# Patient Record
Sex: MALE | Race: WHITE | NOT HISPANIC OR LATINO | Employment: OTHER | URBAN - METROPOLITAN AREA
[De-identification: names, ages, dates, MRNs, and addresses within clinical notes are randomized per-mention and may not be internally consistent; named-entity substitution may affect disease eponyms.]

---

## 2021-09-13 ENCOUNTER — TELEPHONE (OUTPATIENT)
Dept: OTOLARYNGOLOGY | Facility: CLINIC | Age: 81
End: 2021-09-13

## 2021-09-13 NOTE — TELEPHONE ENCOUNTER
Health Call Center    Phone Message    May a detailed message be left on voicemail: yes     Reason for Call: Other:   Pt would like an eval to determine if he is a candidate for the inspire device.     Sleep study completed with Dr Johns/Dr Louis at [facility] in [place], [place]. Completed about 10 yrs ago.  Pt will reach out and ask that they fax over sleep study results. Please keep an eye out for that.     Pt has [employer] Medicare insurance and already spoke with Hao, the financial counselor. Pt states that he will be enrolling in a new plan come October and will have new insurance at the beginning of January. Hao ok'd for pt to schedule an appt now for after the first of January.     Please follow-up with pt to advise/schedule.     Action Taken: Other:  ENT    Travel Screening: Not Applicable

## 2021-09-14 NOTE — TELEPHONE ENCOUNTER
Writer called and informed pt that unfortunately Dr. Hu is leaving the clinic and is not taking on any new patients at this time. Writer informed pt that there is another doctor who works with inspire deceives that pt can contact. Pt was provided the clinic number for Dr. Garcia's [place] [facility] [place] [facility]. Writer also informed pt that to be considered for the inspire device pt needs to have a sleep study done within the past three years and based on pts message to call center pts current sleep study is not within that 3 year time frame. Pt expressed understanding.    Christin Parish, EMT

## 2022-01-10 ENCOUNTER — TRANSFERRED RECORDS (OUTPATIENT)
Dept: HEALTH INFORMATION MANAGEMENT | Facility: CLINIC | Age: 82
End: 2022-01-10
Payer: COMMERCIAL

## 2022-02-09 ENCOUNTER — OFFICE VISIT (OUTPATIENT)
Dept: OPHTHALMOLOGY | Facility: CLINIC | Age: 82
End: 2022-02-09
Payer: COMMERCIAL

## 2022-02-09 DIAGNOSIS — H02.135 SENILE ECTROPION OF LEFT LOWER EYELID: ICD-10-CM

## 2022-02-09 DIAGNOSIS — H02.403 INVOLUTIONAL PTOSIS, ACQUIRED, BILATERAL: ICD-10-CM

## 2022-02-09 DIAGNOSIS — H02.831 DERMATOCHALASIS OF BOTH UPPER EYELIDS: Primary | ICD-10-CM

## 2022-02-09 DIAGNOSIS — H02.834 DERMATOCHALASIS OF BOTH UPPER EYELIDS: Primary | ICD-10-CM

## 2022-02-09 DIAGNOSIS — H02.132 SENILE ECTROPION OF RIGHT LOWER EYELID: ICD-10-CM

## 2022-02-09 PROCEDURE — 92081 LIMITED VISUAL FIELD XM: CPT | Performed by: OPHTHALMOLOGY

## 2022-02-09 PROCEDURE — 99204 OFFICE O/P NEW MOD 45 MIN: CPT | Performed by: OPHTHALMOLOGY

## 2022-02-09 PROCEDURE — 92285 EXTERNAL OCULAR PHOTOGRAPHY: CPT | Performed by: OPHTHALMOLOGY

## 2022-02-09 RX ORDER — NITROGLYCERIN 0.4 MG/1
0.4 TABLET SUBLINGUAL
COMMUNITY
Start: 2021-06-22

## 2022-02-09 RX ORDER — BISOPROLOL FUMARATE 5 MG/1
5 TABLET, FILM COATED ORAL
COMMUNITY
Start: 2022-01-31 | End: 2023-01-31

## 2022-02-09 RX ORDER — FLUTICASONE PROPIONATE 50 MCG
1-2 SPRAY, SUSPENSION (ML) NASAL
COMMUNITY
Start: 2021-03-22

## 2022-02-09 RX ORDER — CLOPIDOGREL BISULFATE 75 MG/1
75 TABLET ORAL
COMMUNITY
Start: 2022-01-31

## 2022-02-09 RX ORDER — TAMSULOSIN HYDROCHLORIDE 0.4 MG/1
0.4 CAPSULE ORAL
COMMUNITY
Start: 2021-09-01 | End: 2022-09-01

## 2022-02-09 RX ORDER — AMITRIPTYLINE HYDROCHLORIDE 10 MG/1
10 TABLET ORAL
COMMUNITY
Start: 2021-12-20 | End: 2022-12-20

## 2022-02-09 RX ORDER — LEVOTHYROXINE SODIUM 175 UG/1
175 TABLET ORAL
COMMUNITY
Start: 2021-06-22

## 2022-02-09 RX ORDER — POLYETHYLENE GLYCOL 3350 17 G/17G
17 POWDER, FOR SOLUTION ORAL
COMMUNITY

## 2022-02-09 RX ORDER — CYCLOSPORINE 0.5 MG/ML
EMULSION OPHTHALMIC
COMMUNITY
Start: 2021-06-10

## 2022-02-09 RX ORDER — AMIODARONE HYDROCHLORIDE 200 MG/1
100 TABLET ORAL
COMMUNITY
Start: 2022-01-31

## 2022-02-09 RX ORDER — MELATONIN 10 MG
10 CAPSULE ORAL
COMMUNITY

## 2022-02-09 RX ORDER — LISINOPRIL 5 MG/1
5 TABLET ORAL
COMMUNITY

## 2022-02-09 RX ORDER — ALPRAZOLAM 0.5 MG
0.5 TABLET ORAL
COMMUNITY
Start: 2021-12-20

## 2022-02-09 RX ORDER — EVOLOCUMAB 140 MG/ML
140 INJECTION, SOLUTION SUBCUTANEOUS
COMMUNITY
Start: 2021-11-24

## 2022-02-09 ASSESSMENT — EXTERNAL EXAM - LEFT EYE: OS_EXAM: BROW PTOSIS, WITH FRONTALIS RELAXED, BROW IS BELOW SUPERIOR ORBITAL RIM AND LATERALLY INLINE WITH UPPER LASHES

## 2022-02-09 ASSESSMENT — VISUAL ACUITY
OS_CC: 20/25
OD_CC: 20/25
METHOD: SNELLEN - LINEAR
OD_CC+: -2

## 2022-02-09 ASSESSMENT — CONF VISUAL FIELD: COMMENTS: TANGENT VISUAL FIELD PERFORMED TODAY.

## 2022-02-09 ASSESSMENT — SLIT LAMP EXAM - LIDS: COMMENTS: HEAVY DERMATOCHALASIS RESTING ON LASHES, TRUE PTOSIS

## 2022-02-09 ASSESSMENT — TONOMETRY
IOP_METHOD: TONOPEN
OD_IOP_MMHG: 12
OS_IOP_MMHG: 13

## 2022-02-09 ASSESSMENT — EXTERNAL EXAM - RIGHT EYE: OD_EXAM: BROW PTOSIS, WITH FRONTALIS RELAXED, BROW IS BELOW SUPERIOR ORBITAL RIM AND LATERALLY INLINE WITH UPPER LASHES

## 2022-02-09 NOTE — PROGRESS NOTES
Oculoplastic Clinic New Patient    Patient: Barry Medel MRN# [id number]   YOB: 1940 Age: 81 year old   Date of Visit: Feb 9, 2022    CC: Droopy eyelids obstructing vision.              HPI:     Chief Complaint(s) and History of Present Illness(es)     Droopy Eye Lid Evaluation     Laterality: right upper lid and left upper lid    Associated signs and symptoms: dry eyes.  Negative for eye pain    Pain scale: 0/10              Comments     Patient referred by Dr. Polanco for dermatochalasis consult. Patient   states he has a strong family history of droopy lids. His have been   bothering him for several years. He had a consult in the past but never   went through with the procedure. He notices that he needs to raise his   eyebrows up in order to see clearly but still feels he is only getting   about 50% of the vision he should have. Needs bright lights to see   clearly.     Thyroid removed.          Barry Medel is a 81 year old male who has noted gradual onset of droopy eyelids over the past years. The droopy eyelid is interfering with activities of daily living including driving, and reading. The patient denies double vision, variability of the eyelid position. Uses Restasis twice a day   Has cardiac stents, pace maker defibrilator   On Plavix   EXAM:     MRD1: 0 mm both eyes   Dermatochalasis with excess skin touching eyelashes   Brow ptosis with brow resting below superior orbital rim   Aponeurotic ptosis    VISUAL FIELD:  Right eye untaped:15 degrees Right eye taped:50 degrees  Left eye untaped:20 degrees Left eye taped:50 degrees    Assessment & Plan     Barry Medel is a 81 year old male with the following diagnoses:   1. Dermatochalasis of both upper eyelids    2. Involutional ptosis, acquired, bilateral    3. Senile ectropion of right lower eyelid    4. Senile ectropion of left lower eyelid       Both upper eyelid blepharoplasty and doyle muscle conjunctival resection   Ptosis repair 8.5 mm - [id number]    Considering BLLB (TCF, SP, Formal LTS) - would be self pay. Addendum: decided against BLLB.     [place] due to pacemaker.  Will need to make sure can hold aspirin and Plavix. If needs to continue aspirin can still proceed, but not both.    Pointed out brow asymmetry.          PHOTOS DEMONSTRATE:      Significant dermatochalasis with lids resting on eyelashes and obstructing visual axis  Blepharoptosis  Brow ptosis with thicker brow skin and hairs below the lateral superior orbital rim    Attending Physician Attestation: Complete documentation of historical and exam elements from today's encounter can be found in the full encounter summary report (not reduplicated in this progress note). I personally obtained the chief complaint(s) and history of present illness. I confirmed and edited as necessary the review of systems, past medical/surgical history, family history, social history, and examination findings as documented by others; and I examined the patient myself. I personally reviewed the relevant tests, images, and reports as documented above. I formulated and edited as necessary the assessment and plan and discussed the findings and management plan with the patient. Srini Huynh MD      Today with Barry Medel I reviewed the indications, risks, benefits, and alternatives of the proposed surgical procedure including, but not limited to, failure obtain the desired result  and need for additional surgery, bleeding, infection, loss of vision, loss of the eye, and the remote possibility of permanent damage to any organ system or death with the use of anesthesia.  I provided multiple opportunities for the questions, answered all questions to the best of my ability, and confirmed that my answers and my discussion were understood.

## 2022-02-09 NOTE — LETTER
2022         RE:  :  MRN: Barry Medel  1940  [id number]     Dear Dr. Polanco,    Thank you for asking me to see your patient, Barry Medel, for an oculoplastic   consultation.  My assessment and plan are below.          HPI:     Chief Complaint(s) and History of Present Illness(es)     Droopy Eye Lid Evaluation     Laterality: right upper lid and left upper lid    Associated signs and symptoms: dry eyes.  Negative for eye pain    Pain scale: 0/10              Comments     Patient referred by Dr. Polanco for dermatochalasis consult. Patient   states he has a strong family history of droopy lids. His have been   bothering him for several years. He had a consult in the past but never   went through with the procedure. He notices that he needs to raise his   eyebrows up in order to see clearly but still feels he is only getting   about 50% of the vision he should have. Needs bright lights to see   clearly.     Thyroid removed.          Barry Medel is a 81 year old male who has noted gradual onset of droopy eyelids over the past years. The droopy eyelid is interfering with activities of daily living including driving, and reading. The patient denies double vision, variability of the eyelid position. Uses Restasis twice a day   Has cardiac stents, pace maker defibrilator   On Plavix   EXAM:     MRD1: 0 mm both eyes   Dermatochalasis with excess skin touching eyelashes   Brow ptosis with brow resting below superior orbital rim   Aponeurotic ptosis    VISUAL FIELD:  Right eye untaped:15 degrees Right eye taped:50 degrees  Left eye untaped:20 degrees Left eye taped:50 degrees    Assessment & Plan     Barry Medel is a 81 year old male with the following diagnoses:   1. Dermatochalasis of both upper eyelids    2. Involutional ptosis, acquired, bilateral    3. Senile ectropion of right lower eyelid    4. Senile ectropion of left lower eyelid       Both upper eyelid blepharoplasty and  doyle muscle conjunctival resection  Ptosis repair 8.5 mm - [id number]    Considering BLLB (TCF, SP, Formal LTS) - would be self pay.    [place] due to pacemaker.  Will need to make sure can hold aspirin and Plavix. If needs to continue aspirin can still proceed, but not both.    Pointed out brow asymmetry.         Again, thank you for allowing me to participate in the care of your patient.      Sincerely,    Srini Huynh MD  Department of Ophthalmology and Visual Neurosciences  [facility]    CC: ADRIANE GOMEZ  [facility]  [address] [facility] [address]  Via Fax: [phone]

## 2022-02-09 NOTE — NURSING NOTE
Chief Complaints and History of Present Illnesses   Patient presents with     Droopy Eye Lid Evaluation       Chief Complaint(s) and History of Present Illness(es)     Droopy Eye Lid Evaluation     Laterality: right upper lid and left upper lid    Associated signs and symptoms: dry eyes.  Negative for eye pain    Pain scale: 0/10              Comments     Patient referred by Dr. Polanco for dermatochalasis consult. Patient states he has a strong family history of droopy lids. His have been bothering him for several years. He had a consult in the past but never went through with the procedure. He notices that he needs to raise his eyebrows up in order to see clearly but still feels he is only getting about 50% of the vision he should have. Needs bright lights to see clearly.     Thyroid removed.                 Adelso Bernal, Ophthalmic Assistant

## 2022-03-06 DIAGNOSIS — Z11.59 ENCOUNTER FOR SCREENING FOR OTHER VIRAL DISEASES: Primary | ICD-10-CM

## 2022-04-11 ENCOUNTER — TELEPHONE (OUTPATIENT)
Dept: OPHTHALMOLOGY | Facility: CLINIC | Age: 82
End: 2022-04-11
Payer: COMMERCIAL

## 2022-04-11 LAB — EJECTION FRACTION: 45 %

## 2022-04-11 NOTE — TELEPHONE ENCOUNTER
Patients wife called and left a message to advise that patient needs to cancel his eye procedure that is schedule on 4/15 at the [facility] OR location with Dr. Streeter due to having some cardiac issues that need to be addressed first.    Patients wife states they will call back when ready to reschedule.    This writer confirmed with patient the number to reschedule.    Jaci Delgado  Perioperative   Ophthalmology/Oculoplastics  [phone]

## 2022-06-20 ENCOUNTER — TELEPHONE (OUTPATIENT)
Dept: OPHTHALMOLOGY | Facility: CLINIC | Age: 82
End: 2022-06-20
Payer: COMMERCIAL

## 2022-06-20 NOTE — TELEPHONE ENCOUNTER
Spoke with patient to schedule surgery with Dr. Huynh    Surgery was scheduled on 8/5 at [facility]  Patient will have H&P at [facility] IN [place]     Patient is aware a COVID-19 test is needed before their procedure. The test should be with-in 4 days of their procedure.   Test Details: Date 8/1 Location [facility] IN [place]    Post-Op visit was scheduled on 8/17  Patient is aware a / is needed day of surgery.   Surgery packet was mailed 6/20, patient has my direct contact information for any further questions.

## 2022-08-01 ENCOUNTER — TELEPHONE (OUTPATIENT)
Dept: OPHTHALMOLOGY | Facility: CLINIC | Age: 82
End: 2022-08-01

## 2022-08-01 NOTE — TELEPHONE ENCOUNTER
[facility] Call Center    Phone Message    May a detailed message be left on voicemail: yes     Reason for Call: Mary has some questions for Dr. Huynh's nurse about the upcoming surgery for Barry. Please give her a call back. Thank you    Action Taken: Message routed to:  Adult Clinics: Eye p [phone]    Travel Screening: Not Applicable

## 2022-08-01 NOTE — TELEPHONE ENCOUNTER
Spoke with patients daughter, Maria L, and answered her surgery questions. She asked where to send the COVID results, gave fax number for [facility]. She then asked for copies of preparing for surgery sheet that they were initially given back in February. I was able to scan and email those to her at [email or web address].     Karen Campa, TYREE, 1:34 PM 08/01/2022

## 2022-08-04 ENCOUNTER — ANESTHESIA EVENT (OUTPATIENT)
Dept: SURGERY | Facility: CLINIC | Age: 82
End: 2022-08-04
Payer: COMMERCIAL

## 2022-08-04 RX ORDER — POLYETHYLENE GLYCOL 3350 17 G/17G
17 POWDER, FOR SOLUTION ORAL
COMMUNITY

## 2022-08-04 RX ORDER — MELATONIN 10 MG
10 CAPSULE ORAL
COMMUNITY

## 2022-08-04 RX ORDER — TAMSULOSIN HYDROCHLORIDE 0.4 MG/1
0.4 CAPSULE ORAL
COMMUNITY
Start: 2022-03-15 | End: 2023-03-15

## 2022-08-04 RX ORDER — NITROGLYCERIN 0.4 MG/1
0.4 TABLET SUBLINGUAL
COMMUNITY
Start: 2021-06-22

## 2022-08-05 ENCOUNTER — HOSPITAL ENCOUNTER (OUTPATIENT)
Facility: CLINIC | Age: 82
Discharge: HOME | End: 2022-08-05
Attending: OPHTHALMOLOGY | Admitting: OPHTHALMOLOGY
Payer: COMMERCIAL

## 2022-08-05 ENCOUNTER — ANESTHESIA (OUTPATIENT)
Dept: SURGERY | Facility: CLINIC | Age: 82
End: 2022-08-05
Payer: COMMERCIAL

## 2022-08-05 VITALS
RESPIRATION RATE: 16 BRPM | SYSTOLIC BLOOD PRESSURE: 138 MMHG | DIASTOLIC BLOOD PRESSURE: 80 MMHG | WEIGHT: 224.4 LBS | HEART RATE: 74 BPM | TEMPERATURE: 98 F | OXYGEN SATURATION: 98 % | BODY MASS INDEX: 33.24 KG/M2 | HEIGHT: 69 IN

## 2022-08-05 DIAGNOSIS — H02.831 DERMATOCHALASIS OF BOTH UPPER EYELIDS: ICD-10-CM

## 2022-08-05 DIAGNOSIS — H02.834 DERMATOCHALASIS OF BOTH UPPER EYELIDS: ICD-10-CM

## 2022-08-05 DIAGNOSIS — H02.403 ACQUIRED INVOLUTIONAL PTOSIS OF BOTH EYELIDS: Primary | ICD-10-CM

## 2022-08-05 PROCEDURE — 360N000076 HC SURGERY LEVEL 3, PER MIN: Performed by: OPHTHALMOLOGY

## 2022-08-05 PROCEDURE — 710N000009 HC RECOVERY PHASE 1, LEVEL 1, PER MIN: Performed by: OPHTHALMOLOGY

## 2022-08-05 PROCEDURE — 250N000011 HC RX IP 250 OP 636

## 2022-08-05 PROCEDURE — 710N000012 HC RECOVERY PHASE 2, PER MINUTE: Performed by: OPHTHALMOLOGY

## 2022-08-05 PROCEDURE — 370N000017 HC ANESTHESIA TECHNICAL FEE, PER MIN: Performed by: OPHTHALMOLOGY

## 2022-08-05 PROCEDURE — 250N000009 HC RX 250: Performed by: OPHTHALMOLOGY

## 2022-08-05 PROCEDURE — 999N000141 HC STATISTIC PRE-PROCEDURE NURSING ASSESSMENT: Performed by: OPHTHALMOLOGY

## 2022-08-05 PROCEDURE — 250N000009 HC RX 250

## 2022-08-05 PROCEDURE — 250N000013 HC RX MED GY IP 250 OP 250 PS 637: Performed by: ANESTHESIOLOGY

## 2022-08-05 PROCEDURE — 272N000001 HC OR GENERAL SUPPLY STERILE: Performed by: OPHTHALMOLOGY

## 2022-08-05 PROCEDURE — 15823 BLEPHARP UPR EYELID XCSV SKN: CPT | Mod: 50 | Performed by: OPHTHALMOLOGY

## 2022-08-05 PROCEDURE — 258N000003 HC RX IP 258 OP 636

## 2022-08-05 RX ORDER — ERYTHROMYCIN 5 MG/G
OINTMENT OPHTHALMIC
Status: DISCONTINUED
Start: 2022-08-05 | End: 2022-08-05 | Stop reason: HOSPADM

## 2022-08-05 RX ORDER — LIDOCAINE HCL/EPINEPHRINE/PF 2%-1:200K
VIAL (ML) INJECTION
Status: DISCONTINUED
Start: 2022-08-05 | End: 2022-08-05 | Stop reason: HOSPADM

## 2022-08-05 RX ORDER — ERYTHROMYCIN 5 MG/G
OINTMENT OPHTHALMIC PRN
Status: DISCONTINUED | OUTPATIENT
Start: 2022-08-05 | End: 2022-08-05 | Stop reason: HOSPADM

## 2022-08-05 RX ORDER — TETRACAINE HYDROCHLORIDE 5 MG/ML
SOLUTION OPHTHALMIC PRN
Status: DISCONTINUED | OUTPATIENT
Start: 2022-08-05 | End: 2022-08-05 | Stop reason: HOSPADM

## 2022-08-05 RX ORDER — ERYTHROMYCIN 5 MG/G
OINTMENT OPHTHALMIC
Qty: 3.5 G | Refills: 0 | Status: SHIPPED | OUTPATIENT
Start: 2022-08-05

## 2022-08-05 RX ORDER — SODIUM CHLORIDE, SODIUM LACTATE, POTASSIUM CHLORIDE, CALCIUM CHLORIDE 600; 310; 30; 20 MG/100ML; MG/100ML; MG/100ML; MG/100ML
INJECTION, SOLUTION INTRAVENOUS CONTINUOUS PRN
Status: DISCONTINUED | OUTPATIENT
Start: 2022-08-05 | End: 2022-08-05

## 2022-08-05 RX ORDER — TETRACAINE HYDROCHLORIDE 5 MG/ML
SOLUTION OPHTHALMIC
Status: DISCONTINUED
Start: 2022-08-05 | End: 2022-08-05 | Stop reason: HOSPADM

## 2022-08-05 RX ORDER — ONDANSETRON 2 MG/ML
INJECTION INTRAMUSCULAR; INTRAVENOUS PRN
Status: DISCONTINUED | OUTPATIENT
Start: 2022-08-05 | End: 2022-08-05

## 2022-08-05 RX ORDER — LIDOCAINE 40 MG/G
CREAM TOPICAL
Status: DISCONTINUED | OUTPATIENT
Start: 2022-08-05 | End: 2022-08-05 | Stop reason: HOSPADM

## 2022-08-05 RX ORDER — CEFAZOLIN SODIUM/WATER 2 G/20 ML
2 SYRINGE (ML) INTRAVENOUS
Status: DISCONTINUED | OUTPATIENT
Start: 2022-08-05 | End: 2022-08-05 | Stop reason: HOSPADM

## 2022-08-05 RX ORDER — ACETAMINOPHEN 325 MG/1
650 TABLET ORAL ONCE
Status: COMPLETED | OUTPATIENT
Start: 2022-08-05 | End: 2022-08-05

## 2022-08-05 RX ORDER — BUPIVACAINE HYDROCHLORIDE 5 MG/ML
INJECTION, SOLUTION EPIDURAL; INTRACAUDAL
Status: DISCONTINUED
Start: 2022-08-05 | End: 2022-08-05 | Stop reason: HOSPADM

## 2022-08-05 RX ORDER — LIDOCAINE HYDROCHLORIDE 20 MG/ML
INJECTION, SOLUTION INFILTRATION; PERINEURAL PRN
Status: DISCONTINUED | OUTPATIENT
Start: 2022-08-05 | End: 2022-08-05

## 2022-08-05 RX ORDER — PROPOFOL 10 MG/ML
INJECTION, EMULSION INTRAVENOUS PRN
Status: DISCONTINUED | OUTPATIENT
Start: 2022-08-05 | End: 2022-08-05

## 2022-08-05 RX ORDER — NEOMYCIN SULFATE, POLYMYXIN B SULFATE AND DEXAMETHASONE 3.5; 10000; 1 MG/ML; [USP'U]/ML; MG/ML
1 SUSPENSION/ DROPS OPHTHALMIC 4 TIMES DAILY
Qty: 2 ML | Refills: 0 | Status: SHIPPED | OUTPATIENT
Start: 2022-08-05 | End: 2022-08-15

## 2022-08-05 RX ADMIN — PROPOFOL 10 MG: 10 INJECTION, EMULSION INTRAVENOUS at 12:34

## 2022-08-05 RX ADMIN — LIDOCAINE HYDROCHLORIDE 60 MG: 20 INJECTION, SOLUTION INFILTRATION; PERINEURAL at 12:23

## 2022-08-05 RX ADMIN — PROPOFOL 60 MG: 10 INJECTION, EMULSION INTRAVENOUS at 12:23

## 2022-08-05 RX ADMIN — PROPOFOL 20 MG: 10 INJECTION, EMULSION INTRAVENOUS at 12:37

## 2022-08-05 RX ADMIN — ACETAMINOPHEN 650 MG: 325 TABLET ORAL at 13:38

## 2022-08-05 RX ADMIN — ONDANSETRON 4 MG: 2 INJECTION INTRAMUSCULAR; INTRAVENOUS at 12:18

## 2022-08-05 RX ADMIN — PROPOFOL 20 MG: 10 INJECTION, EMULSION INTRAVENOUS at 12:30

## 2022-08-05 RX ADMIN — PROPOFOL 10 MG: 10 INJECTION, EMULSION INTRAVENOUS at 12:45

## 2022-08-05 RX ADMIN — PHENYLEPHRINE HYDROCHLORIDE 100 MCG: 10 INJECTION INTRAVENOUS at 12:35

## 2022-08-05 RX ADMIN — PHENYLEPHRINE HYDROCHLORIDE 100 MCG: 10 INJECTION INTRAVENOUS at 12:43

## 2022-08-05 RX ADMIN — SODIUM CHLORIDE, POTASSIUM CHLORIDE, SODIUM LACTATE AND CALCIUM CHLORIDE: 600; 310; 30; 20 INJECTION, SOLUTION INTRAVENOUS at 12:13

## 2022-08-05 ASSESSMENT — LIFESTYLE VARIABLES: TOBACCO_USE: 0

## 2022-08-05 ASSESSMENT — ENCOUNTER SYMPTOMS: DYSRHYTHMIAS: 1

## 2022-08-05 NOTE — DISCHARGE INSTRUCTIONS
Today you were given 650 mg of Tylenol at 1:39 p.m. The recommended daily maximum dose is 4000 mg.      Post-operative Instructions  Ophthalmic Plastic and Reconstructive Surgery    Srini Huynh M.D.     All instructions apply to the operated eye(s) or eyelid(s).    Wound care and personal care  ? If a patch or bandage has been placed, please leave this in place until seen by your physician. Ensure that the bandage does not get wet when you take a shower.   ? Apply ice compresses 15 minutes of every hour while awake for 2 days. If you are sleeping, you don't need to wake up to ice. As long as there is no further bleeding, after two days, switch to warm water compresses for five minutes, four times a day until seen by your physician.   ? You may shower or wash your hair the day after surgery. Do not go swimming for at least 2 weeks to prevent contamination of your wounds.  ? You may go for walks and light activity is ok, but no heavy (over 15 pounds) lifting, bending or excessive straining for one week.   ? Do not apply make-up to the eyes or eyelids for 2 weeks after surgery.  ? Expect some swelling, bruising, black eye (even into the lower eyelids and cheeks). Also expect serum caking, crusting and discharge from the eye and/or incisions. A small amount of surface bleeding, and depending on the type of surgery, bleeding from the inside of the eyelid, is normal for the first 48 hours.  ? Avoid straining, bending at the waist, or lifting more than 15 pounds for 1 week. Sleeping with your head elevated, such as in a recliner, for the first several days can help swelling resolve more quickly.   ? Do continue to ambulate (walk) as you normally would - being sedentary after surgery can cause blood clots.   ? Your eye(s) and eyelid(s) may be painful and tender. This is normal after surgery.      Contact information and follow-up  ? Return to the Eye Clinic for a follow-up appointment with your physician as scheduled.  If no appointment has been scheduled:   - [facility] eye clinic: [phone] for an appointment with Dr. Huynh within 2 to 3 weeks from your date of surgery.      -  [facility] eye clinic: [phone] for an appointment with Dr. Huynh within 2 to 3 weeks from your date of surgery.     ? For severe pain, bleeding, or loss of vision, call the [facility] at [phone] or [facility] at [phone].     After hours or on weekends and holidays, call [phone] and ask to speak with the ophthalmologist on call.    An on call person can be reached after hours for concerns. The on call doctor should not call in medication refill requests after hours or on weekends, so please plan accordingly. An effort has been made to provide adequate pain medications following every surgery, and refills will not be provided in most instances.     Medications  ? Restart all regular home medications and eye drops. If you take Plavix or Aspirin on a regular basis, wait for 72 hours after your surgery before restarting these in order to decrease the risk of bleeding complications. If you take warfarin (coumadin) you may restart the day after surgery.  ? Avoid aspirin and aspirin-like medications (Motrin, Aleve, Ibuprofen, [place] etc) for 72 hours to reduce the risk of bleeding. You may take Tylenol (acetaminophen) for pain.  ? In addition to your home medications, take the following post-operative medications as prescribed by your physician.    ? Apply antibiotic ointment to all sutures three times a day, and into the operated eye(s) at night.  ? Instill eye drops 3 times a day for 10 days.  ? In many cases, postoperative discomfort can be managed with Tylenol alone.    ? WARNING: Some pain medications contain acetaminophen. You must not take more than 4,000 mg of acetaminophen per 24-hour period. This is equivalent to 12 tablets of Norco, Percocet or  Tylenol #3. If you take other over-the-counter medications containing acetaminophen, you must take the amount of acetaminophen into account and reduce the number of prescribed pain pills accordingly.  ? Prescribed narcotic medications may make you drowsy. You must not drive a car, operate heavy machinery or drink alcohol while taking them.  ? Prescribed narcotic pain medications may cause constipation and nausea. Take them with some food to prevent a stomach upset.    Same Day Surgery Discharge Instructions for  Sedation and General Anesthesia     It's not unusual to feel dizzy, light-headed or faint for up to 24 hours after surgery or while taking pain medication.  If you have these symptoms: sit for a few minutes before standing and have someone assist you when you get up to walk or use the bathroom.    You should rest and relax for the next 24 hours. We recommend you make arrangements to have an adult stay with you for at least 24 hours after your discharge.  Avoid hazardous and strenuous activity.    DO NOT DRIVE any vehicle or operate mechanical equipment for 24 hours following the end of your surgery.  Even though you may feel normal, your reactions may be affected by the medication you have received.    Do not drink alcoholic beverages for 24 hours following surgery.     Slowly progress to your regular diet as you feel able. It's not unusual to feel nauseated and/or vomit after receiving anesthesia.  If you develop these symptoms, drink clear liquids (apple juice, ginger ale, broth, 7-up, etc. ) until you feel better.  If your nausea and vomiting persists for 24 hours, please notify your surgeon.      All narcotic pain medications, along with inactivity and anesthesia, can cause constipation. Drinking plenty of liquids and increasing fiber intake will help.    For any questions of a medical nature, call your surgeon.    Do not make important decisions for 24 hours.    If you had general anesthesia, you may  have a sore throat for a couple of days related to the breathing tube used during surgery.  You may use Cepacol lozenges to help with this discomfort.  If it worsens or if you develop a fever, contact your surgeon.     If you feel your pain is not well managed with the pain medications prescribed by your surgeon, please contact your surgeon's office to let them know so they can address your concerns.            **If you have questions or concerns about your procedure,   call Dr. Huynh at [phone] [facility] and [phone] [place]**

## 2022-08-05 NOTE — OP NOTE
PREOPERATIVE DIAGNOSIS:   1. Bilateral upper eyelid dermatochalasis.   2. Bilateral upper eyelid ptosis.   POSTOPERATIVE DIAGNOSES:   1. Bilateral upper eyelid dermatochalasis.   2. Bilateral upper eyelid ptosis.   PROCEDURE PERFORMED:   1. Bilateral upper blepharoplasty.   2. Bilateral upper eyelid ptosis repair by Molina's muscle conjunctival resection.   SURGEON: Srini Huynh MD, MD   ASSISTANT: Sirisha Bowden MD and Key Lucas MD  ANESTHESIA: Monitored with local infiltration of a 50/50 mixture of 2% lidocaine with epinephrine and 0.5% Marcaine.   COMPLICATIONS: None.   ESTIMATED BLOOD LOSS: Less than 5 mL.   HISTORY: Barry Medel  presented with upper lid drooping interfering with his superior visual field and activities of daily living. After the risks, benefits and alternatives to the proposed procedure were explained, informed consent was obtained.   DESCRIPTION OF PROCEDURE: Barry Medel  was brought to the operating room and placed supine on the operating table. IV sedation was given. The upper eyelid crease and excess upper eyelid skin was marked with a marking pen and infiltrated with local anesthetic.  The area was prepped and draped in the typical sterile ophthalmic fashion. Attention was directed to the right side. Skin was incised following marked lines. Skin and orbicularis muscle flap were excised with high temperature cautery. A 4-0 silk suture was placed to the eyelid margin and the eyelid everted over a Desmarres retractor. A 6-0 silk suture was threaded 4.25 mm from the superior tarsal border and used to elevate the conjunctiva and Molina's muscle which was clamped with a Putterman clamp. A 6-0 plain gut suture was run in a horizontal mattress fashion 1 mm below the clamp. This was run from lateral to medial and then medial to lateral and the clamped tissues were excised with a 15 blade. The suture was externalized through the blepharoplasty incision and tied in a permanent  fashion. The blepharoplasty incision was closed with running 6-0 plain gut suture. The same procedure was performed on the left side.The patient tolerated the procedure well. Antibiotic ointment was applied. Barry Medel left the operating room in stable condition.   Srini Huynh MD, MD

## 2022-08-05 NOTE — ANESTHESIA CARE TRANSFER NOTE
Patient: Barry Medel    Procedure: Procedure(s):  Both upper eyelid blepharoplasty and ptosis repair       Diagnosis: Dermatochalasis of both upper eyelids [H02.831, H02.834]  Involutional ptosis, acquired, bilateral [H02.403]  Diagnosis Additional Information: No value filed.    Anesthesia Type:   MAC     Note:    Oropharynx: oropharynx clear of all foreign objects and spontaneously breathing  Level of Consciousness: awake  Oxygen Supplementation: room air    Independent Airway: airway patency satisfactory and stable  Dentition: dentition unchanged  Vital Signs Stable: post-procedure vital signs reviewed and stable  Report to RN Given: handoff report given  Patient transferred to: PACU (same day)  Comments: VSS and spont breathing.   Handoff Report: Identifed the Patient, Identified the Reponsible Provider, Reviewed the pertinent medical history, Discussed the surgical course, Reviewed Intra-OP anesthesia mangement and issues during anesthesia, Set expectations for post-procedure period and Allowed opportunity for questions and acknowledgement of understanding      Vitals:  Vitals Value Taken Time   /68 08/05/22 1308   Temp     Pulse 70 08/05/22 1308   Resp 8 08/05/22 1308   SpO2 95 % 08/05/22 1308   Vitals shown include unvalidated device data.    Electronically Signed By: HARSHAD Bautista CRNA  August 5, 2022  1:09 PM

## 2022-08-05 NOTE — ANESTHESIA POSTPROCEDURE EVALUATION
Patient: Barry Medel    Procedure: Procedure(s):  Both upper eyelid blepharoplasty and ptosis repair       Anesthesia Type:  MAC    Note:  Disposition: Outpatient   Postop Pain Control: Uneventful            Sign Out: Well controlled pain   PONV: No   Neuro/Psych: Uneventful            Sign Out: Acceptable/Baseline neuro status   Airway/Respiratory: Uneventful            Sign Out: Acceptable/Baseline resp. status   CV/Hemodynamics: Uneventful            Sign Out: Acceptable CV status   Other NRE: NONE   DID A NON-ROUTINE EVENT OCCUR? No           Last vitals:  Vitals Value Taken Time   /79 08/05/22 1345   Temp 36.2  C (97.2  F) 08/05/22 1345   Pulse 70 08/05/22 1345   Resp 11 08/05/22 1345   SpO2 97 % 08/05/22 1345   Vitals shown include unvalidated device data.    Electronically Signed By: Ke Lopez MD  August 5, 2022  3:28 PM

## 2022-08-05 NOTE — OR NURSING
Pt discharged in stable condition. Medications and instructions provided and handed directly to daughter. Questions answered. Call out to Dr Milligan re: when to restart ASA and plavix, to update daughter with response.

## 2022-08-05 NOTE — ANESTHESIA PREPROCEDURE EVALUATION
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   Allergies   Allergen Reactions     Statins [Hmg-Coa-R Inhibitors] Muscle Pain (Myalgia) and Other (See Comments)     Muscle aches        Social History     Tobacco Use     Smoking status: Never Smoker     Smokeless tobacco: Never Used   Substance Use Topics     Alcohol use: Not on file      Wt Readings from Last 1 Encounters:   No data found for Wt        Anesthesia Evaluation            ROS/MED HX  ENT/Pulmonary:     (+) sleep apnea,  (-) tobacco use   Neurologic:       Cardiovascular: Comment: HOCM    (+) Dyslipidemia hypertension--CAD --stent-ICD dysrhythmias, Other,     METS/Exercise Tolerance:     Hematologic:       Musculoskeletal:       GI/Hepatic:    (-) GERD   Renal/Genitourinary:     (+) BPH,     Endo: Comment: IFG    (+) thyroid problem, Obesity,     Psychiatric/Substance Use:       Infectious Disease:       Malignancy:       Other:            Physical Exam    Airway        Mallampati: III   TM distance: > 3 FB   Neck ROM: full   Mouth opening: > 3 cm    Respiratory Devices and Support         Dental  no notable dental history         Cardiovascular   cardiovascular exam normal          Pulmonary   pulmonary exam normal                OUTSIDE LABS:  CBC: No results found for: WBC, HGB, HCT, PLT  BMP: No  results found for: NA, POTASSIUM, CHLORIDE, CO2, BUN, CR, GLC  COAGS: No results found for: PTT, INR, FIBR  POC: No results found for: BGM, HCG, HCGS  HEPATIC: No results found for: ALBUMIN, PROTTOTAL, ALT, AST, GGT, ALKPHOS, BILITOTAL, BILIDIRECT, SATHISH  OTHER: No results found for: PH, LACT, A1C, KENNA, PHOS, MAG, LIPASE, AMYLASE, TSH, T4, T3, CRP, SED    Anesthesia Plan    ASA Status:  3   NPO Status:  NPO Appropriate    Anesthesia Type: MAC.              Consents    Anesthesia Plan(s) and associated risks, benefits, and realistic alternatives discussed. Questions answered and patient/representative(s) expressed understanding.    - Discussed:     - Discussed with:  Patient         Postoperative Care            Comments:                Ke Lopez MD

## 2022-08-05 NOTE — BRIEF OP NOTE
[facility]    Brief Operative Note    Pre-operative diagnosis: Dermatochalasis of both upper eyelids [H02.831, H02.834]  Involutional ptosis, acquired, bilateral [H02.403]  Post-operative diagnosis Same as pre-operative diagnosis    Procedure: Procedure(s):  Both upper eyelid blepharoplasty and ptosis repair  Surgeon: Surgeon(s) and Role:     * Srini Huynh MD - Primary   Sirisha Bowden MD - fellow  Key becker MD - resident  Anesthesia: MAC with Local   Estimated Blood Loss: Minimal    Drains: None  Specimens: * No specimens in log *  Findings:   as expected.  Complications: None.  Implants: * No implants in log *

## 2022-08-05 NOTE — INTERVAL H&P NOTE
"I have reviewed the surgical (or preoperative) H&P that is linked to this encounter, and examined the patient. There are no significant changes    Clinical Conditions Present on Arrival:  Clinically Significant Risk Factors Present on Admission                  # Platelet Defect: home medication list includes an antiplatelet medication  # Obesity: Estimated body mass index is 33.14 kg/m  as calculated from the following:    Height as of this encounter: 1.753 m (5' 9\").    Weight as of this encounter: 101.8 kg (224 lb 6.4 oz).       "

## 2022-08-09 ENCOUNTER — TELEPHONE (OUTPATIENT)
Dept: OPHTHALMOLOGY | Facility: CLINIC | Age: 82
End: 2022-08-09

## 2022-08-09 NOTE — TELEPHONE ENCOUNTER
Spoke with patient regarding his PO concerns. DOS 8/5/22. Patient has not been using his Restasis much since surgery, advised to restart Restasis, wait 10-15 between that drop and the maxitrol drop. Also recommended using artificial tears throughout the day. Pt states he has Systane at home, again discussed wait time between drops. Patient doing warm compresses every 3-4 hours, will continue to do so. Also let him know that the erythromycin ointment does cause blurred vision due to the thickness of the ointment.     Patient has post op scheduled for 8/17, advised to call with any additional concerns or worsening symptoms otherwise would see at his follow up appointment.     Karen Campa, COA, 11:28 AM 08/09/2022

## 2022-08-09 NOTE — TELEPHONE ENCOUNTER
[facility] Call Center    Phone Message    May a detailed message be left on voicemail: yes     Reason for Call: Other: pt calling and has many questions about what to expect after his surgery, he still can't read a paper, blurry vision, not happy, please advise with them     Action Taken: Message routed to:  Adult Clinics: Eye p [phone]    Travel Screening: Not Applicable

## 2022-08-17 ENCOUNTER — OFFICE VISIT (OUTPATIENT)
Dept: OPHTHALMOLOGY | Facility: CLINIC | Age: 82
End: 2022-08-17
Payer: COMMERCIAL

## 2022-08-17 DIAGNOSIS — H02.831 DERMATOCHALASIS OF BOTH UPPER EYELIDS: Primary | ICD-10-CM

## 2022-08-17 DIAGNOSIS — H02.834 DERMATOCHALASIS OF BOTH UPPER EYELIDS: Primary | ICD-10-CM

## 2022-08-17 DIAGNOSIS — H02.403 INVOLUTIONAL PTOSIS, ACQUIRED, BILATERAL: ICD-10-CM

## 2022-08-17 PROCEDURE — 99024 POSTOP FOLLOW-UP VISIT: CPT | Performed by: OPHTHALMOLOGY

## 2022-08-17 ASSESSMENT — VISUAL ACUITY
CORRECTION_TYPE: GLASSES
OD_CC: 20/25
OS_CC+: -1
METHOD: SNELLEN - LINEAR
OS_CC: 20/25

## 2022-08-17 ASSESSMENT — TONOMETRY
OS_IOP_MMHG: 09
OD_IOP_MMHG: 10
IOP_METHOD: ICARE

## 2022-08-17 NOTE — PATIENT INSTRUCTIONS
"- Apply warm compresses for 1 minute three times a day   - Apply Aquaphor or Vaseline to the incision at bedtime.   - The neomycin/polymixin/dexamethasone drop should be stopped at day 10. If you have symptoms of eye irritation, it is good to use over the counter artificial tears. Good brands include Refresh, Blink, and Systane. Do NOT get drops that are for \"red eyes.\"   - It is normal for the incision to appear raised, red, itch and have small lumps. You can gently massage any small bumps along the incision line. These can take up to six months to resolve.    "

## 2022-08-17 NOTE — NURSING NOTE
Chief Complaints and History of Present Illnesses   Patient presents with     Post Op (Ophthalmology) Both Eyes       Chief Complaint(s) and History of Present Illness(es)     Post Op (Ophthalmology) Both Eyes     Laterality: both eyes              Comments     S/P 1. Bilateral upper blepharoplasty, 2. Bilateral upper eyelid ptosis repair by Molina's muscle conjunctival resection, 08/05/2022.   Eyes feel very dry and gritty. Using Genteal drops, with some improvement.  Completed course of EES maryuri.   He need a surgery on his heart and wants to know how soon he can go under anaesthesia after just having this procedure done.                 Adelso Bernal, Ophthalmic Assistant

## 2022-08-17 NOTE — PROGRESS NOTES
"Chief Complaint(s) and History of Present Illness(es)     Post Op (Ophthalmology) Both Eyes     Laterality: both eyes              Comments     S/P 1. Bilateral upper blepharoplasty, 2. Bilateral upper eyelid ptosis   repair by Molina's muscle conjunctival resection, 08/05/2022.   Eyes feel very dry and gritty. Using Genteal drops, with some improvement.  Completed course of EES maryuri.   He need a surgery on his heart and wants to know how soon he can go under   anaesthesia after just having this procedure done.            Slightly under corrected ptosis both eyes, but improved. Symmetry is good. Regular eye doctor: Dr. Polanco.     Patient Instructions   - Apply warm compresses for 1 minute three times a day   - Apply Aquaphor or Vaseline to the incision at bedtime.   - The neomycin/polymixin/dexamethasone drop should be stopped at day 10. If you have symptoms of eye irritation, it is good to use over the counter artificial tears. Good brands include Refresh, Blink, and Systane. Do NOT get drops that are for \"red eyes.\"   - It is normal for the incision to appear raised, red, itch and have small lumps. You can gently massage any small bumps along the incision line. These can take up to six months to resolve.      Return in about 2 months (around 10/17/2022).      Attending Physician Attestation: Complete documentation of historical and exam elements from today's encounter can be found in the full encounter summary report (not reduplicated in this progress note). I personally obtained the chief complaint(s) and history of present illness. I confirmed and edited as necessary the review of systems, past medical/surgical history, family history, social history, and examination findings as documented by others; and I examined the patient myself. I personally reviewed the relevant tests, images, and reports as documented above. I formulated and edited as necessary the assessment and plan and discussed the findings and " management plan with the patient and family. I personally reviewed the ophthalmic test(s) associated with this encounter, agree with the interpretation(s) as documented by the resident/fellow, and have edited the corresponding report(s) as necessary. Srini Huynh MD

## (undated) DEVICE — SU SILK 6-0 C-1 18" 707G

## (undated) DEVICE — GLOVE PROTEXIS W/NEU-THERA 7.5  2D73TE75

## (undated) DEVICE — SOL NACL 0.9% IRRIG 1000ML BOTTLE 2F7124

## (undated) DEVICE — SU PLAIN 6-0 G-1DA 18" 770G

## (undated) DEVICE — SOL WATER IRRIG 1000ML BOTTLE 2F7114

## (undated) DEVICE — ESU EYE HIGH TEMP 65410-183

## (undated) DEVICE — ESU PENCIL W/SMOKE EVAC CVPLP2000

## (undated) DEVICE — ESU NDL COLORADO MICRO 3CM STR N103A

## (undated) DEVICE — EYE PREP BETADINE 5% SOLUTION 30ML 0065-0411-30

## (undated) DEVICE — LINEN TOWEL PACK X5 5464

## (undated) DEVICE — PACK OCULOPLATIC SEN15OCFSD

## (undated) DEVICE — SU SILK 4-0 J-1 18" 734G

## (undated) RX ORDER — ACETAMINOPHEN 325 MG/1
TABLET ORAL
Status: DISPENSED
Start: 2022-08-05